# Patient Record
Sex: MALE | Race: WHITE | NOT HISPANIC OR LATINO
[De-identification: names, ages, dates, MRNs, and addresses within clinical notes are randomized per-mention and may not be internally consistent; named-entity substitution may affect disease eponyms.]

---

## 2021-05-19 ENCOUNTER — TRANSCRIPTION ENCOUNTER (OUTPATIENT)
Age: 46
End: 2021-05-19

## 2023-03-28 ENCOUNTER — APPOINTMENT (OUTPATIENT)
Dept: ORTHOPEDIC SURGERY | Facility: CLINIC | Age: 48
End: 2023-03-28
Payer: OTHER MISCELLANEOUS

## 2023-03-28 VITALS — HEIGHT: 72 IN | WEIGHT: 230 LBS | BODY MASS INDEX: 31.15 KG/M2

## 2023-03-28 DIAGNOSIS — M25.522 PAIN IN LEFT ELBOW: ICD-10-CM

## 2023-03-28 PROCEDURE — J3490M: CUSTOM

## 2023-03-28 PROCEDURE — 73080 X-RAY EXAM OF ELBOW: CPT | Mod: LT

## 2023-03-28 PROCEDURE — 99203 OFFICE O/P NEW LOW 30 MIN: CPT | Mod: 25

## 2023-03-28 PROCEDURE — 20551 NJX 1 TENDON ORIGIN/INSJ: CPT | Mod: LT

## 2023-03-28 NOTE — HISTORY OF PRESENT ILLNESS
[Work related] : work related [Sudden] : sudden [6] : 6 [Dull/Aching] : dull/aching [Nothing helps with pain getting better] : Nothing helps with pain getting better [Not working due to injury] : Work status: not working due to injury [de-identified] : L elbow pain after climbing up ladder at work  in [employer]\par Felt pop in the elbow\par NSIADS, ice no releif  [] : no [FreeTextEntry1] : L elbow  [FreeTextEntry3] : 3/21/23 [FreeTextEntry5] : patient states he was pulling something from the ladder he felt a pulled, he is having pain  [de-identified] : activity  [de-identified] : [employer]

## 2023-04-05 ENCOUNTER — FORM ENCOUNTER (OUTPATIENT)
Age: 48
End: 2023-04-05

## 2023-04-06 ENCOUNTER — APPOINTMENT (OUTPATIENT)
Dept: MRI IMAGING | Facility: CLINIC | Age: 48
End: 2023-04-06
Payer: OTHER MISCELLANEOUS

## 2023-04-06 PROCEDURE — 73221 MRI JOINT UPR EXTREM W/O DYE: CPT | Mod: LT

## 2023-04-21 ENCOUNTER — APPOINTMENT (OUTPATIENT)
Dept: ORTHOPEDIC SURGERY | Facility: CLINIC | Age: 48
End: 2023-04-21
Payer: OTHER MISCELLANEOUS

## 2023-04-21 PROCEDURE — 99213 OFFICE O/P EST LOW 20 MIN: CPT

## 2023-04-21 NOTE — HISTORY OF PRESENT ILLNESS
[Work related] : work related [Sudden] : sudden [6] : 6 [Dull/Aching] : dull/aching [Nothing helps with pain getting better] : Nothing helps with pain getting better [Not working due to injury] : Work status: not working due to injury [de-identified] : L elbow pain after climbing up ladder at work  in [employer]\par \par \par MRI shows low grade partial tear [] : no [FreeTextEntry1] : L elbow  [FreeTextEntry3] : 3/21/23 [FreeTextEntry5] : pt is having some relief from the inj last visit  [de-identified] : activity  [de-identified] : [employer]

## 2023-06-05 ENCOUNTER — APPOINTMENT (OUTPATIENT)
Dept: ORTHOPEDIC SURGERY | Facility: CLINIC | Age: 48
End: 2023-06-05
Payer: OTHER MISCELLANEOUS

## 2023-06-05 VITALS — HEIGHT: 72 IN | BODY MASS INDEX: 31.15 KG/M2 | WEIGHT: 230 LBS

## 2023-06-05 DIAGNOSIS — S53.402A UNSPECIFIED SPRAIN OF LEFT ELBOW, INITIAL ENCOUNTER: ICD-10-CM

## 2023-06-05 DIAGNOSIS — M77.12 LATERAL EPICONDYLITIS, LEFT ELBOW: ICD-10-CM

## 2023-06-05 PROCEDURE — 20551 NJX 1 TENDON ORIGIN/INSJ: CPT | Mod: LT

## 2023-06-05 PROCEDURE — J3490M: CUSTOM

## 2023-06-05 PROCEDURE — 99214 OFFICE O/P EST MOD 30 MIN: CPT | Mod: 25

## 2023-06-05 NOTE — ASSESSMENT
[FreeTextEntry1] : L Lateral epicondyle injection was performed because of pain inflammation\par Anesthesia: ethyl chloride sprayed topically\par Celestone 6mg: An injection of Celestone 2 cc\par Lidocaine: An injection of Lidocaine 1% 2 cc\par Marcaine: An injection of Marcaine 0.5% 2 cc\par \par Patient has tried OTC's including aspirin, Ibuprofen, Aleve etc or prescription NSAIDS, and/or exercises at home and/ or\par physical therapy without satisfactory response.\par After verbal consent using sterile preparation and technique. The risks, benefits, and alternatives to cortisone injection\par were explained in full to the patient. Risks outlined include but are not limited to infection, sepsis, bleeding, scarring, skin\par discoloration, temporary increase in pain, syncopal episode, failure to resolve symptoms, allergic reaction, symptom\par recurrence, and elevation of blood sugar in diabetics. Patient understood the risks. All questions were answered. After\par discussion of options, patient requested an injection. Oral informed consent was obtained and sterile prep was done of the\par injection site. Sterile technique was utilized for the procedure including the preparation of the solutions used for the\par injection. Patient tolerated the procedure well. Advised to ice the injection site this evening.\par Prep with betadine locally to site. Sterile technique used

## 2023-06-05 NOTE — HISTORY OF PRESENT ILLNESS
[Work related] : work related [7] : 7 [6] : 6 [Dull/Aching] : dull/aching [Full time] : Work status: full time [de-identified] : L lateral epicondylitis\par Has pain  [FreeTextEntry1] : L elbow [de-identified] : PT

## 2023-06-21 ENCOUNTER — FORM ENCOUNTER (OUTPATIENT)
Age: 48
End: 2023-06-21